# Patient Record
Sex: FEMALE | Race: AMERICAN INDIAN OR ALASKA NATIVE
[De-identification: names, ages, dates, MRNs, and addresses within clinical notes are randomized per-mention and may not be internally consistent; named-entity substitution may affect disease eponyms.]

---

## 2020-02-06 NOTE — CONSULTATION
History of Present Illness





- Reason for Consult


Consult date: 02/06/20


end stage renal disease, other (initiating hemodialysis)





- History of Present Illness


This is a 74 year old female patient well known to our practice with a pmh 

significant for end stage renal disease, anemia associated with chronic renal 

failure, type 2 diabetes mellitus, hypertension, chronic lower extremity edema, 

and metabolic acidosis. Patient is being direct admitted to have tunneled HD 

catheter placed, and to initiate hemodialysis treatments. Patient and sister 

were seen in our office earlier this week and informed of the procedure and need

for hospitalization. Patient denies alcohol, drugs, and tobacco use. Denies use 

of NSAIDs. She is accompanied by her sister today. Labs on 

admission/consultation significant for bicarb 16 and creatinine 6.1. Nephrology 

was consulted for further evaluation and treatment of end stage renal disease 

and initiation of dialysis.








Past History


Past Medical History: ESRD, hypertension, other (chronic lower extremitity 

edema/metabolic acidosis)





Medications and Allergies


                                    Allergies











Allergy/AdvReac Type Severity Reaction Status Date / Time


 


No Known Allergies Allergy   Unverified 02/06/20 11:19











                                Home Medications











 Medication  Instructions  Recorded  Confirmed  Last Taken  Type


 


Amlodipine Besylate [Norvasc] 10 mg PO DAILY 02/06/20 02/06/20 02/06/20 History





    10 mg 


 


Furosemide [Lasix TAB] 80 mg PO BID 02/06/20 02/06/20 02/06/20 History





    80 mg 














Review of Systems


Constitutional: no weight loss, no fever, no chills


Ears, nose, mouth and throat: no nasal discharge, no epistaxis, no headache


Cardiovascular: edema (bilateral lower extremities), no chest pain, no 

palpitations, no shortness of breath


Respiratory: no cough, no shortness of breath


Gastrointestinal: no abdominal pain, no nausea, no vomiting, no diarrhea


Genitourinary Female: no flank pain, no dysuria, no urinary frequency, no 

hematuria


Musculoskeletal: no frequent falls


Integumentary: no rash, no pruritis, no wounds





Exam





- General Appearance


General appearance: well-developed, well-nourished, appears stated age


EENT: ATNC, PERRL, mucous membranes moist


Neck: Present: neck supple, trachea midline


Respiratory: Clear to Ascultation, Decreased Breath Sounds (bilateral bases)


Heart: regular, normal heart rate, S1S2, no murmurs


Gastrointestinal: Present: normal, normoactive bowel sounds


Integumentary: no rash, warm and dry


Neurologic: no focal deficit, alert and oriented x3


Musculoskeletal: Present: other (2+ pitting edema bilateral lower extremities)


Psychiatric: mood/affect appropriate, cooperative





Results





- Lab Results





                                 02/06/20 12:27





                                 02/06/20 12:27





Assessment and Plan


1. End stage renal disease:


Likely secondary to diabetic nephropathy.


GFR is currently 8.


Avoid nephrotoxic agents.


Meds dosage based on GFR.


Patient needs to initiate hemodialysis. 


Needs tunneled HD catheter placed, IR consulted.


Hemodialysis: 2/6.





2. FEN:


Metabolic acidosis, monitor.


Monitor lytes.





3. Hypertension:





4. Edema:


Bilateral lower extremities, chronic.





5. Anemia:


Epogen with HD.


Monitor.





6. Type 2 DM:

## 2020-02-06 NOTE — OPERATIVE REPORT
Operative Report


Operative Report: 





Exam: Ultrasound and fluoroscopic guided placement of tunneled hemodialysis 

catheter





Clinical indication: Patient with a history of end-stage renal disease requiring

dialysis access





Date: 02/06/2020





Procedure: Following an explanation of the risks, benefits and alternatives; 

written informed consent was obtained.  The patient was brought to the 

angiographic suite and placed in supine position on the examination table.  

Initial ultrasound evaluation of her neck demonstrated a patent right internal 

regular vein.  The patient's right neck and chest wall were prepped and draped 

in the usual sterile fashion.  1% lidocaine was used for anesthesia.





Under ultrasound guidance, the right internal jugular vein was cannulated with a

7 cm 18-gauge needle.  A 0.035 guidewire was advanced centrally.  The needle was

removed and a 5 Sammarinese vertebral catheter advanced over the guidewire.  Together

the guidewire and catheter were advanced into the IVC.  The catheter was 

removed.





An appropriate catheter exit site was chosen along the lateral right chest wall.

 1% lidocaine was used for anesthesia at the catheter exit site and along the 

tunnel tract.  A Bard 23 cm glide path tunneled hemodialysis catheter was then 

tunneled antegrade from the catheter exit site of the venotomy site.





Following serial dilation over the guidewire under fluoroscopy, a 15 Sammarinese 

peel-away sheath was placed over the guidewire under fluoroscopy and advanced 

centrally.  The guidewire and trocar were removed.  The catheter was placed 

through the peel-away sheath and the tip positioned in the proximal right 

atrium.  The peel-away sheath was removed.  Both ports flushed and aspirated 

easily and more than locked with appropriate volumes of heparin.





The venotomy was closed using 3-0 Vicryl suture and Dermabond.  3-0 Vicryl 

suture and Dermabond were also used to approximate the catheter exit site.





The patient tolerated the procedure well.  There were no immediate post 

procedure complications.





Conscious sedation was performed under the guidance of radiologic nursing.  

Continuous cardiopulmonary monitoring was utilized.





Impression: Ultrasound and fluoroscopic guided placement of tunneled 

hemodialysis catheter via the right internal jugular vein.

## 2020-02-06 NOTE — HISTORY AND PHYSICAL REPORT
History of Present Illness


Date of admission: 


02/06/20 11:45





Chief complaint: 





My doctor sent me here because I need dialysis


History of present illness: 


73 YO Female with ESRD, HTN, Acidosis admitted directly at the request of Dr. Moore.  Thanking him patient was seen and evaluated by the nephrology service 

and was found to have end-stage renal disease and was in need of dialysis.  

Patient instructed to seek further care at [facility].  Patient admitted directly to 

[facility] and placed in observation status for medical 

stabilization and initiation of dialysis.  Vascular surgery service was 

consulted for dialysis catheter placement.  After placement of dialysis catheter

patient was taken urgently to the dialysis suite for dialysis.  Patient seen and

evaluated upon arrival to her room.  Patient denies fever, chills, chest pain, 

palpitations, hemoptysis, bright red blood per rectum, skin rash, recent ill 

contacts.  No prior admission for review.  No medication listed at time of my 

admission for reconciliation.





Past History


Past Medical History: ESRD, hypertension, other (chronic lower extremitity 

edema/metabolic acidosis)


Past Surgical History: Other (Dialysis access)


Social history: single.  denies: smoking, alcohol abuse, prescription drug abuse


Family history: hypertension





Medications and Allergies


                                    Allergies











Allergy/AdvReac Type Severity Reaction Status Date / Time


 


No Known Allergies Allergy   Unverified 02/06/20 11:19











                                Home Medications











 Medication  Instructions  Recorded  Confirmed  Last Taken  Type


 


Amlodipine Besylate [Norvasc] 10 mg PO DAILY 02/06/20 02/06/20 02/06/20 History





    10 mg 


 


Furosemide [Lasix TAB] 80 mg PO BID 02/06/20 02/06/20 02/06/20 History





    80 mg 














Review of Systems


Constitutional: no weight loss, no weight gain, no chills, no sweats


Ears, nose, mouth and throat: no ear pain, no tinnitis, no decreased hearing, no

nose pain, no nasal congestion


Breasts: no change in shape, no swelling, no mass


Cardiovascular: no chest pain, no orthopnea, no palpitations, no rapid/irregular

heart beat, no edema, no lightheadedness, no shortness of breath


Respiratory: no cough, no cough with sputum, no hemoptysis, no shortness of 

breath


Gastrointestinal: no abdominal pain, no nausea, no diarrhea, no constipation


Genitourinary Female: no pelvic pain, no flank pain, no menorrhagia


Menstruation: no post hysterectomy, no ammenorrhea, no ammenorrhea on BC, no 

period heavy, no period spotting


Rectal: no pain, no incontinence, no bleeding


Musculoskeletal: no neck stiffness, no neck pain, no shooting arm pain, no 

shooting leg pain


Integumentary: no rash, no pruritis, no redness, no sores, no wounds


Neurological: no transient paralysis, no numbness, no tingling


Psychiatric: no anxiety, no sleep disturbances, no insomnia, no hypersomnia, no 

change in appetite, no change in libido


Endocrine: no cold intolerance, no heat intolerance, no excessive thirst, no 

flushing


Hematologic/Lymphatic: no easy bruising, no lymphedema


Allergic/Immunologic: no urticaria, no allergic rhinitis, no wheezing, no 

persistent infections, no anaphylaxis, no angioedema





Exam





- Constitutional


Vitals: 


                                        











Temp Pulse Resp BP Pulse Ox


 


 98.5 F   84   18   168/69   98 


 


 02/06/20 12:23  02/06/20 12:23  02/06/20 12:23  02/06/20 12:23  02/06/20 12:23











General appearance: Present: mild distress, obese





- EENT


Eyes: Present: PERRL


ENT: hearing intact, clear oral mucosa





- Neck


Neck: Present: supple, normal ROM





- Respiratory


Respiratory effort: normal


Respiratory: bilateral: CTA





- Cardiovascular


Heart Sounds: Present: S1 & S2.  Absent: rub, click





- Extremities


Extremities: pulses symmetrical, No edema


Peripheral Pulses: within normal limits





- Abdominal


General gastrointestinal: Present: soft, non-tender, non-distended, normal bowel

sounds


Female genitourinary: Present: normal





- Integumentary


Integumentary: Present: clear, warm, dry





- Musculoskeletal


Musculoskeletal: gait normal, strength equal bilaterally





- Psychiatric


Psychiatric: appropriate mood/affect, intact judgment & insight





- Neurologic


Neurologic: CNII-XII intact, moves all extremities





Results





- Labs


CBC & Chem 7: 


                                 02/06/20 12:27





                                 02/06/20 12:27





Assessment and Plan





- Patient Problems


(1) End stage renal disease


Current Visit: Yes   Status: Acute   


Plan to address problem: 


Nephrology consulted in ED, strict I's/O, daily weight, monitor urine output 

every shift, avoid nephrotoxic agents, IR consulted for Vas-Cath placement, 

dialysis as per renal team.








(2) Hypertension


Current Visit: Yes   Status: Acute   


Qualifiers: 


   Hypertension type: essential hypertension   Qualified Code(s): I10 - 

Essential (primary) hypertension   


Plan to address problem: 


Monitor blood pressure every shift, supportive care, continue medical 

management.








(3) Metabolic acidosis


Current Visit: Yes   Status: Acute   


Plan to address problem: 


IV fluid resuscitation therapy, repeat BMP, urgent dialysis as per renal team.  

IV bicarbonate therapy as clinically indicated.








(4) DVT prophylaxis


Current Visit: Yes   Status: Acute   


Plan to address problem: 


SCD to bilateral lower extremities while in bed, patient ambulatory.

## 2020-02-07 NOTE — PROGRESS NOTE
Assessment and Plan


/End stage renal disease


Nephrology consulted in ED, strict I's/O, daily weight, monitor urine output 

every shift, avoid nephrotoxic agents, 


Status post perm-Cath placement by IR, dialysis as per renal team.


Need outpatient dialysis set up








/Hypertension


Monitor blood pressure every shift, supportive care, continue medical 

management.





/febrile illness


- negative UA and CXR


- blood cx ordered, monitor for now








/Metabolic acidosis due to progression of chronic renal disease


Monitor BMP, sodium bicarbonate p.o.





/Anemia likely due to chronic disease, continue to monitor H&H





/ DVT prophylaxis


SCD to bilateral lower extremities while in bed, patient ambulatory.





Disposition Home, pending outpatient dialysis set up





Brief History:


75 YO Female with ESRD, HTN, Acidosis admitted directly at the request of Dr. Moore for the initiation of dialysis.  She was placed PermCath by vascular 

yesterday,  consulted for outpatient dialysis set up.  Discharge 

pending on outpatient dialysis arrangement





Radiological data: 


Chest x-ray: No acute disease





Hospitalist Physical exam:


GENERAL:  well-developed elderly female lying on bed appeared to be in no 

discomfort. 


HEENT: Normocephalic.  Atraumatic.  No conjunctival congestion or icterus. 

Patient has moist mucous membranes.


NECK: Supple.  Trachea midline.


CHEST/LUNGS: Clear to auscultated bilaterally, breathing nonlabored. No wheezes 

crackles or rhonchi.


HEART/CARDIOVASCULAR: Regular in rate and rhythm.  S1 and S2 positive.


ABDOMEN: Abdomen is soft, nontender.  Patient has normal bowel sounds.  


SKIN: There is no rash.  Warm and dry.


NEURO:  No focal motor deficit.  Follows command.


MUSCULOSKELETAL: No joint effusion or tenderness.


EXTRIMITY: No edema, no cyanosis or clubbing.


PSYCH:  Cooperative.








Subjective


Date of service: 02/07/20


Interval history: 





Patient seen and examined.  Medical records and medication list reviewed.  


No acute event overnight noted by the RN.  


Patient denies any chest pain or difficulty breathing.  Patient is tolerating 

diet. 


Status post dialysis today, patient spiking fever


Discussed plan of care at bedside with patient.








Objective





- Constitutional


Vitals: 


                               Vital Signs - 12hr











  02/07/20





  06:13


 


Temperature 99.0 F


 


Pulse Rate 67


 


Respiratory 18





Rate 


 


O2 Sat by Pulse 96





Oximetry 














- Labs


CBC & Chem 7: 


                                 02/06/20 12:27





                                 02/08/20 04:06


Labs: 


                              Abnormal lab results











  02/06/20 Range/Units





  12:27 


 


Carbon Dioxide  16 L  (22-30)  mmol/L


 


BUN  54 H  (7-17)  mg/dL


 


Creatinine  6.1 H  (0.7-1.2)  mg/dL


 


Glucose  148 H  ()  mg/dL

## 2020-02-08 NOTE — PROGRESS NOTE
Assessment and Plan





/febrile illness


- negative UA and CXR


- blood cx ordered -final result pending, monitor for now





/End stage renal disease


Nephrology consulted in ED, strict I's/O, daily weight, monitor urine output 

every shift, avoid nephrotoxic agents, 


Status post perm-Cath placement by IR, dialysis as per renal team.


Need outpatient dialysis set up








/Hypertension


Monitor blood pressure every shift, supportive care, continue medical 

management.








/Metabolic acidosis due to progression of chronic renal disease


Monitor BMP, sodium bicarbonate p.o.





/Anemia likely due to chronic disease, continue to monitor H&H





/ DVT prophylaxis


SCD to bilateral lower extremities while in bed, patient ambulatory.





Disposition Home, pending outpatient dialysis set up





Brief History:


75 YO Female with ESRD, HTN, Acidosis admitted directly at the request of Dr. Moore for the initiation of dialysis.  She was placed PermCath by vascular 

yesterday,  consulted for outpatient dialysis set up.  Discharge 

pending on outpatient dialysis arrangement and blood cx report





Radiological data: 


Chest x-ray: No acute disease





Hospitalist Physical exam:


GENERAL:  well-developed elderly female lying on bed appeared to be in no 

discomfort. 


HEENT: Normocephalic.  Atraumatic.  No conjunctival congestion or icterus. 

Patient has moist mucous membranes.


NECK: Supple.  Trachea midline.


CHEST/LUNGS: Clear to auscultated bilaterally, breathing nonlabored. No wheezes 

crackles or rhonchi.


HEART/CARDIOVASCULAR: Regular in rate and rhythm.  S1 and S2 positive.


ABDOMEN: Abdomen is soft, nontender.  Patient has normal bowel sounds.  


SKIN: There is no rash.  Warm and dry.


NEURO:  No focal motor deficit.  Follows command.


MUSCULOSKELETAL: No joint effusion or tenderness.


EXTRIMITY: No edema, no cyanosis or clubbing.


PSYCH:  Cooperative.








Subjective


Date of service: 02/08/20


Interval history: 





Patient seen and examined.  Medical records and medication list reviewed.  


No acute event overnight noted by the RN.  


Patient denies any chest pain or difficulty breathing.  Patient is tolerating 

diet. 


Patient had few episodes of low grade temp


Discussed plan of care at bedside with patient.








Objective





- Constitutional


Vitals: 


                               Vital Signs - 12hr











  02/08/20 02/08/20 02/08/20





  05:02 09:55 10:00


 


Temperature 98.8 F 98.8 F 


 


Pulse Rate 70 66 65


 


Respiratory 18 18 





Rate   


 


Blood Pressure 146/60 154/74 164/62


 


O2 Sat by Pulse 96  





Oximetry   














  02/08/20 02/08/20 02/08/20





  10:15 10:30 10:45


 


Temperature   


 


Pulse Rate 61 61 61


 


Respiratory   





Rate   


 


Blood Pressure 160/78 164/77 187/80


 


O2 Sat by Pulse   





Oximetry   














  02/08/20 02/08/20 02/08/20





  11:00 11:15 11:30


 


Temperature   


 


Pulse Rate 62 62 60


 


Respiratory   





Rate   


 


Blood Pressure 144/66 154/67 166/74


 


O2 Sat by Pulse   





Oximetry   














  02/08/20 02/08/20 02/08/20





  11:45 12:00 12:15


 


Temperature   


 


Pulse Rate 56 L 61 67


 


Respiratory   





Rate   


 


Blood Pressure 185/75 128/70 152/77


 


O2 Sat by Pulse   





Oximetry   














  02/08/20 02/08/20





  12:30 12:45


 


Temperature  


 


Pulse Rate 62 63


 


Respiratory  





Rate  


 


Blood Pressure 185/72 165/77


 


O2 Sat by Pulse  





Oximetry  














- Labs


CBC & Chem 7: 


                                 02/06/20 12:27





                                 02/08/20 04:06


Labs: 


                              Abnormal lab results











  02/08/20 Range/Units





  04:06 


 


Sodium  136 L  (137-145)  mmol/L


 


Chloride  96.8 L  ()  mmol/L


 


BUN  28 H  (7-17)  mg/dL


 


Creatinine  4.2 H  (0.7-1.2)  mg/dL


 


Glucose  121 H  ()  mg/dL


 


Calcium  8.1 L  (8.4-10.2)  mg/dL

## 2020-02-08 NOTE — PROGRESS NOTE
Assessment and Plan





1. End stage renal disease:


CKD has progressed to ESRD and started on hemodialysis this admission.


Avoid nephrotoxic agents.


Meds dosage based on GFR.


Patient tolerated HD well.


Hemodialysis: 2/6, 2/7, 2/8.


Await outpatient HD chair.





2. FEN:


Metabolic acidosis, improved.


Monitor lytes.





3. Hypertension:


Monitor BP.





4. Fever:


Unclear source.


CXR negative.


Blood and urine culture pending.





5. Edema:


Bilateral lower extremities, chronic.


Improving.





6. Anemia:


Epogen with HD.


Monitor.





7. Type 2 DM:

















Examination:


General appearance: well-developed, well-nourished, appears stated age, not in 

distress


HEENT: ATNC, ELANA


Neck: neck supple, trachea midline


Respiratory: Clear to Ascultation, decreased breath sounds (bilateral bases)


Heart: regular, normal heart rate, S1S2, no murmur


Gastrointestinal: soft, normoactive bowel sounds, NT


Integumentary: no rash, warm and dry


Neurologic: no focal deficit, alert and oriented x3


Ext: 1+ pitting edema bilateral lower extremities


Psychiatric: mood/affect appropriate, cooperative


Hemodialysis access:  R IJ tunnel catheter











Subjective


Date of service: 02/08/20


Interval history: 


Patient was seen and examined while on HD.


Doing ok.








Objective





- Vital Signs


Vital signs: 


                               Vital Signs - 12hr











  02/08/20 02/08/20 02/08/20





  05:02 09:55 10:00


 


Temperature 98.8 F 98.8 F 


 


Pulse Rate 70 66 65


 


Respiratory 18 18 





Rate   


 


Blood Pressure 146/60 154/74 164/62


 


O2 Sat by Pulse 96  





Oximetry   














  02/08/20 02/08/20 02/08/20





  10:15 10:30 10:45


 


Temperature   


 


Pulse Rate 61 61 61


 


Respiratory   





Rate   


 


Blood Pressure 160/78 164/77 187/80


 


O2 Sat by Pulse   





Oximetry   














  02/08/20 02/08/20 02/08/20





  11:00 11:15 11:30


 


Temperature   


 


Pulse Rate 62 62 60


 


Respiratory   





Rate   


 


Blood Pressure 144/66 154/67 166/74


 


O2 Sat by Pulse   





Oximetry   














  02/08/20 02/08/20 02/08/20





  11:45 12:00 12:15


 


Temperature   


 


Pulse Rate 56 L 61 67


 


Respiratory   





Rate   


 


Blood Pressure 185/75 128/70 152/77


 


O2 Sat by Pulse   





Oximetry   














- Lab





                                 02/06/20 12:27





                                 02/08/20 04:06


                             Most recent lab results











Calcium  8.1 mg/dL (8.4-10.2)  L  02/08/20  04:06    














Medications & Allergies





- Medications


Allergies/Adverse Reactions: 


                                    Allergies





No Known Allergies Allergy (Unverified 02/06/20 11:19)


   








Home Medications: 


                                Home Medications











 Medication  Instructions  Recorded  Confirmed  Last Taken  Type


 


Amlodipine Besylate [Norvasc] 10 mg PO DAILY 02/06/20 02/06/20 02/06/20 History





    10 mg 


 


Furosemide [Lasix TAB] 80 mg PO BID 02/06/20 02/06/20 02/06/20 History





    80 mg 











Active Medications: 














Generic Name Dose Route Start Last Admin





  Trade Name Freq  PRN Reason Stop Dose Admin


 


Acetaminophen  650 mg  02/07/20 01:29  02/07/20 01:50





  Tylenol  PO   650 mg





  Q4H PRN   Administration





  Pain, Mild (1-3)  


 


Epoetin Chente  10,000 unit  02/06/20 14:39 





  Procrit  SUB-Q  





  CHULA PRN  





  hemodialysis  


 


Sodium Chloride  100 mls @ 999 mls/hr  02/07/20 09:12 





  Nacl 0.9%  IV  





  CHULA PRN  





  Hypotension  


 


Sodium Chloride  100 mls @ 999 mls/hr  02/08/20 09:15 





  Nacl 0.9%  IV  





  CHULA PRN  





  Hypotension

## 2020-02-09 NOTE — PROGRESS NOTE
Assessment and Plan





1. End stage renal disease:


CKD has progressed to ESRD and started on hemodialysis this admission.


Avoid nephrotoxic agents.


Meds dosage based on GFR.


Patient tolerated HD well.


Hemodialysis: 2/6, 2/7, 2/8.


Await outpatient HD chair.





2. FEN:


Metabolic acidosis, improved.


Monitor lytes.





3. Hypertension:


Add Lisinopril.


Monitor BP.





4. Fever:


Unclear source.


CXR negative.


Blood and urine culture pending.





5. Edema:


Bilateral lower extremities, chronic.


Improving.





6. Anemia:


Epogen with HD.


Monitor.





7. Type 2 DM:

















Examination:


General appearance: well-developed, well-nourished, appears stated age, not in 

distress


HEENT: ATNC, ELANA


Neck: neck supple, trachea midline


Respiratory: Clear to Ascultation, decreased breath sounds (bilateral bases)


Heart: regular, normal heart rate, S1S2, no murmur


Gastrointestinal: soft, normoactive bowel sounds, NT


Integumentary: no rash, warm and dry


Neurologic: no focal deficit, alert and oriented x3


Ext: 1+ pitting edema bilateral lower extremities


Psychiatric: mood/affect appropriate, cooperative


Hemodialysis access:  R IJ tunnel catheter











Subjective


Date of service: 02/09/20


Interval history: 


Patient was seen and examined while on HD.


Doing ok.








Objective





- Vital Signs


Vital signs: 


                               Vital Signs - 12hr











  02/08/20 02/09/20





  22:43 05:32


 


Temperature 98.5 F 98.9 F


 


Pulse Rate 71 64


 


Respiratory 18 20





Rate  


 


Blood Pressure 147/71 161/84


 


O2 Sat by Pulse 97 96





Oximetry  














- Lab





                                 02/06/20 12:27





                                 02/08/20 04:06


                             Most recent lab results











Calcium  8.1 mg/dL (8.4-10.2)  L  02/08/20  04:06    














Medications & Allergies





- Medications


Allergies/Adverse Reactions: 


                                    Allergies





No Known Allergies Allergy (Unverified 02/06/20 11:19)


   








Home Medications: 


                                Home Medications











 Medication  Instructions  Recorded  Confirmed  Last Taken  Type


 


Amlodipine Besylate [Norvasc] 10 mg PO DAILY 02/06/20 02/06/20 02/06/20 History





    10 mg 


 


Furosemide [Lasix TAB] 80 mg PO BID 02/06/20 02/06/20 02/06/20 History





    80 mg 











Active Medications: 














Generic Name Dose Route Start Last Admin





  Trade Name Freq  PRN Reason Stop Dose Admin


 


Acetaminophen  650 mg  02/07/20 01:29  02/07/20 01:50





  Tylenol  PO   650 mg





  Q4H PRN   Administration





  Pain, Mild (1-3)  


 


Amlodipine Besylate  10 mg  02/08/20 13:00  02/08/20 16:32





  Amlodipine  PO   10 mg





  DAILY SALLY   Administration


 


Epoetin Chente  10,000 unit  02/06/20 14:39 





  Procrit  SUB-Q  





  CHULA PRN  





  hemodialysis  


 


Heparin Sodium (Porcine)  5,000 unit  02/08/20 22:00 





  Heparin  SUB-Q  





  Q12HR SALLY  


 


Sodium Chloride  100 mls @ 999 mls/hr  02/07/20 09:12 





  Nacl 0.9%  IV  





  CHULA PRN  





  Hypotension  


 


Sodium Chloride  100 mls @ 999 mls/hr  02/08/20 09:15 





  Nacl 0.9%  IV  





  CHULA PRN  





  Hypotension

## 2020-02-09 NOTE — PROGRESS NOTE
Assessment and Plan





/febrile illness - resolved


- negative UA and CXR


- blood cx ordered -negative





/End stage renal disease


Nephrology consulted in ED, strict I's/O, daily weight, monitor urine output 

every shift, avoid nephrotoxic agents, 


Status post perm-Cath placement by IR, dialysis as per renal team.


Need outpatient dialysis set up








/Hypertension


Monitor blood pressure every shift, supportive care, continue medical 

management.








/Metabolic acidosis due to progression of chronic renal disease


Monitor BMP, sodium bicarbonate p.o.





/Anemia likely due to chronic disease, continue to monitor H&H





/ DVT prophylaxis


SCD to bilateral lower extremities while in bed, patient ambulatory.





Disposition Home, pending outpatient dialysis set up





Brief History:


73 YO Female with ESRD, HTN, Acidosis admitted directly at the request of Dr. Moore for the initiation of dialysis.  She was placed PermCath by vascular 

yesterday,  consulted for outpatient dialysis set up.  Discharge 

pending on outpatient dialysis arrangement 





Radiological data: 


Chest x-ray: No acute disease





Hospitalist Physical exam:


GENERAL:  well-developed elderly female lying on bed appeared to be in no 

discomfort. 


HEENT: Normocephalic.  Atraumatic.  No conjunctival congestion or icterus. Veena

ent has moist mucous membranes.


NECK: Supple.  Trachea midline.


CHEST/LUNGS: Clear to auscultated bilaterally, breathing nonlabored. No wheezes 

crackles or rhonchi.


HEART/CARDIOVASCULAR: Regular in rate and rhythm.  S1 and S2 positive.


ABDOMEN: Abdomen is soft, nontender.  Patient has normal bowel sounds.  


SKIN: There is no rash.  Warm and dry.


NEURO:  No focal motor deficit.  Follows command.


MUSCULOSKELETAL: No joint effusion or tenderness.


EXTRIMITY: No edema, no cyanosis or clubbing.


PSYCH:  Cooperative.








Subjective


Date of service: 02/09/20


Interval history: 





Patient seen and examined.  Medical records and medication list reviewed.  


No acute event overnight noted by the RN.  


Patient denies any chest pain or difficulty breathing.  Patient is tolerating 

diet. 


Patient mostly afebrile since yesterday


Discussed plan of care at bedside with patient.


Pending outpt HD setup











Objective





- Constitutional


Vitals: 


                               Vital Signs - 12hr











  02/09/20 02/09/20





  05:32 09:55


 


Temperature 98.9 F 


 


Pulse Rate 64 64


 


Respiratory 20 





Rate  


 


Blood Pressure 161/84 161/84


 


O2 Sat by Pulse 96 





Oximetry  














- Labs


CBC & Chem 7: 


                                 02/10/20 07:50





                                 02/10/20 07:50

## 2020-02-10 NOTE — PROGRESS NOTE
Assessment and Plan





1. End stage renal disease:


CKD has progressed to ESRD and started on hemodialysis this admission.


Avoid nephrotoxic agents.


Meds dosage based on GFR.


Patient tolerated HD well.


Hemodialysis: 2/6, 2/7, 2/8.


Await outpatient HD chair.





2. FEN:


Metabolic acidosis, improved.


Monitor lytes.





3. Hypertension:


BP controlled.


Monitor BP.





4. Fever:


Unclear source.


CXR negative.


Blood and urine culture pending.





5. Edema:


Bilateral lower extremities, chronic.


Improving.





6. Anemia:


Epogen with HD.


Monitor.





7. Type 2 DM:

















Examination:


General appearance: well-developed, well-nourished, appears stated age, not in 

distress


HEENT: ATNC, ELANA


Neck: neck supple, trachea midline


Respiratory: Clear to Ascultation, decreased breath sounds (bilateral bases)


Heart: regular, normal heart rate, S1S2, no murmur


Gastrointestinal: soft, normoactive bowel sounds, NT


Integumentary: no rash, warm and dry


Neurologic: no focal deficit, alert and oriented x3


Ext: trace pitting edema bilateral lower extremities


Psychiatric: mood/affect appropriate, cooperative


Hemodialysis access:  R IJ tunnel catheter











Subjective


Date of service: 02/10/20


Interval history: 


Patient was seen and examined while on HD.


Doing ok.








Objective





- Vital Signs


Vital signs: 


                               Vital Signs - 12hr











  02/10/20 02/10/20 02/10/20





  04:17 04:25 10:14


 


Temperature  99.0 F 


 


Pulse Rate 62 59 L 


 


Respiratory  16 





Rate   


 


Blood Pressure 134/57 120/57 120/45


 


O2 Sat by Pulse 97 99 





Oximetry   














  02/10/20 02/10/20





  10:15 11:18


 


Temperature  97.3 F L


 


Pulse Rate  60


 


Respiratory  20





Rate  


 


Blood Pressure 120/45 115/48


 


O2 Sat by Pulse  100





Oximetry  














- Lab





                                 02/10/20 07:50





                                 02/10/20 07:50


                             Most recent lab results











Calcium  8.2 mg/dL (8.4-10.2)  L  02/10/20  07:50    


 


Phosphorus  4.40 mg/dL (2.5-4.5)   02/10/20  07:50    














Medications & Allergies





- Medications


Allergies/Adverse Reactions: 


                                    Allergies





No Known Allergies Allergy (Unverified 02/06/20 11:19)


   








Home Medications: 


                                Home Medications











 Medication  Instructions  Recorded  Confirmed  Last Taken  Type


 


Amlodipine Besylate [Norvasc] 10 mg PO DAILY 02/06/20 02/06/20 02/06/20 History





    10 mg 


 


Furosemide [Lasix TAB] 80 mg PO BID 02/06/20 02/06/20 02/06/20 History





    80 mg 











Active Medications: 














Generic Name Dose Route Start Last Admin





  Trade Name Freq  PRN Reason Stop Dose Admin


 


Acetaminophen  650 mg  02/07/20 01:29  02/07/20 01:50





  Tylenol  PO   650 mg





  Q4H PRN   Administration





  Pain, Mild (1-3)  


 


Amlodipine Besylate  10 mg  02/08/20 13:00  02/10/20 10:14





  Amlodipine  PO   Not Given





  DAILY SALLY  


 


Epoetin Chente  10,000 unit  02/06/20 14:39 





  Procrit  SUB-Q  





  CHULA PRN  





  hemodialysis  


 


Heparin Sodium (Porcine)  5,000 unit  02/08/20 22:00  02/10/20 10:17





  Heparin  SUB-Q   5,000 unit





  Q12HR SALLY   Administration


 


Sodium Chloride  100 mls @ 999 mls/hr  02/07/20 09:12 





  Nacl 0.9%  IV  





  CHULA PRN  





  Hypotension  


 


Sodium Chloride  100 mls @ 999 mls/hr  02/08/20 09:15 





  Nacl 0.9%  IV  





  CHULA PRN  





  Hypotension  


 


Lisinopril  20 mg  02/09/20 10:00  02/10/20 10:15





  Zestril  PO   Not Given





  QDAY SALLY

## 2020-02-10 NOTE — DISCHARGE SUMMARY
Providers





- Providers


Date of Admission: 


02/10/20 08:28





Date of discharge: 02/10/20


Attending physician: 


SHARAN FLORES





                                        





02/06/20 11:16


Consult to Interventional Radiology [CONS] Routine 


   Consulting Provider: RAUL MONTGOMERY


   Reason For Exam: needs tunneled HD catheter


   Place consult to:: DR. MONTGOMERY


   Notified:: OFFICE


   Phone number called:: [phone]


   Was contact made?: Yes


   If yes, spoke with:: CATHI


   Time called:: 12:18


   Comment:: PAT NOTIFIED





02/06/20 11:41


Consult to Physician [CONS] Routine 


   Comment: 


   Consulting Provider: TIFFANY SIMMONS


   Physician Instructions: 


   Reason For Exam: ESRD NEEDING TO START DIALYSIS











Primary care physician: 


TIFFANY SIMMONS








Hospitalization


Pertinent studies: 





CXR - no acute disease


Hospital course: 





Discharge diagnosis and Mx:





/febrile illness - resolved


- negative UA and CXR


- blood cx ordered -negative





/End stage renal disease


Nephrology consulted in ED, strict I's/O, daily weight, monitor urine output 

every shift, avoid nephrotoxic agents, 


Status post perm-Cath placement by IR, dialysis as per renal team.


Need outpatient dialysis set up - will f/u with Dr Simmons








/Hypertension


Monitor blood pressure every shift, supportive care, continue medical 

management.


on amlodipine and lisinopril 








/Metabolic acidosis due to progression of chronic renal disease


Monitor BMP, sodium bicarbonate p.o.





/Anemia likely due to chronic disease, continue to monitor H&H





/ DVT prophylaxis


SCD to bilateral lower extremities while in bed, patient ambulatory.





Disposition Home, pending outpatient dialysis set up -would be taken care off by

 Dr Simmons








Hospitalist Physical exam:


GENERAL:  well-developed elderly female lying on bed appeared to be in no 

discomfort. 


HEENT: Normocephalic.  Atraumatic.  No conjunctival congestion or icterus. 

Patient has moist mucous membranes.


NECK: Supple.  Trachea midline.


CHEST/LUNGS: Clear to auscultated bilaterally, breathing nonlabored. No wheezes 

crackles or rhonchi.


HEART/CARDIOVASCULAR: Regular in rate and rhythm.  S1 and S2 positive.


ABDOMEN: Abdomen is soft, nontender.  Patient has normal bowel sounds.  


SKIN: There is no rash.  Warm and dry.


NEURO:  No focal motor deficit.  Follows command.


MUSCULOSKELETAL: No joint effusion or tenderness.


EXTRIMITY: No edema, no cyanosis or clubbing.


PSYCH:  Cooperative.








Time spent for discharge: 34 minutes





Core Measure Documentation





- Palliative Care


Palliative Care/ Comfort Measures: Not Applicable





- Core Measures


Any of the following diagnoses?: history only





Exam





- Constitutional


Vitals: 


                                        











Temp Pulse Resp BP Pulse Ox


 


 97.3 F L  60   20   115/48   100 


 


 02/10/20 11:18  02/10/20 11:18  02/10/20 11:18  02/10/20 11:18  02/10/20 11:18














Plan


Activity: advance as tolerated


Weight Bearing Status: Weight Bear as Tolerated


Diet: renal


Special Instructions: restrict fluid intake to (./)


Follow up with: 


TIFFANY SIMMONS MD [Primary Care Provider] - 7 Days


Prescriptions: 


lisinopriL [Zestril TAB] 10 mg PO QDAY #30 tablet

## 2020-02-26 NOTE — EVENT NOTE
ED Screening Note


Date of service: 02/26/20


Time: 13:31


ED Screening Note: 





74 y o fem was sent by her pcp for abnormal lab findings, low hemaglobin








This initial assessment/diagnostic orders/clinical plan/treatment(s) is/are 

subject to change based on patients health status, clinical progression and re-

assessment by fellow clinical providers in the ED. Further treatment and workup 

at subsequent clinical providers discretion. Patient/guardian urged not to elope

from the ED as their condition may be serious if not clinically assessed and 

managed. 





Initial orders include: 


cbc,bmp,

## 2020-02-26 NOTE — EMERGENCY DEPARTMENT REPORT
ED General Adult HPI





- General


Chief complaint: Recheck/Abnormal Lab/Rx


Stated complaint: SENT BY /BLOOD COUNT LOW


Time Seen by Provider: 02/26/20 19:50


Source: patient


Mode of arrival: Ambulatory


Limitations: No Limitations





- History of Present Illness


Initial comments: 





Patient presents to the emergency department per request of her nephrologist for

evaluation of a low hemoglobin reading.  Patient denies any shortness of breath,

headache, dizziness.  Patient also denies chest pain.  Patient denies any blood 

in her stool or any other abnormal bleeding.


-: unknown


Severity scale (0 -10): 0


Improves with: none


Worsens with: none


Associated Symptoms: denies other symptoms


Treatments Prior to Arrival: none





- Related Data


                                Home Medications











 Medication  Instructions  Recorded  Confirmed  Last Taken


 


Amlodipine Besylate [Norvasc] 10 mg PO DAILY 02/06/20 02/06/20 02/06/20





    10 mg








                                  Previous Rx's











 Medication  Instructions  Recorded  Last Taken  Type


 


lisinopriL [Zestril TAB] 10 mg PO QDAY #30 tablet 02/10/20 Unknown Rx











                                    Allergies











Allergy/AdvReac Type Severity Reaction Status Date / Time


 


No Known Allergies Allergy   Unverified 02/06/20 11:19














ED Review of Systems


ROS: 


Stated complaint: SENT BY /BLOOD COUNT LOW


Other details as noted in HPI





Comment: All other systems reviewed and negative


Constitutional: denies: chills, fever


Eyes: denies: eye pain, eye discharge, vision change


ENT: denies: ear pain, throat pain


Respiratory: denies: cough, shortness of breath, wheezing


Cardiovascular: denies: chest pain, palpitations


Endocrine: no symptoms reported


Gastrointestinal: denies: abdominal pain, nausea, diarrhea


Genitourinary: denies: urgency, dysuria, discharge


Musculoskeletal: denies: back pain, joint swelling, arthralgia


Skin: denies: rash, lesions


Neurological: denies: headache, weakness, paresthesias


Psychiatric: denies: anxiety, depression


Hematological/Lymphatic: denies: easy bleeding, easy bruising





ED Past Medical Hx





- Past Medical History


Previous Medical History?: Yes


Hx Hypertension: Yes


Hx Renal Disease: Yes (Tues, Thurs, Sat)





- Surgical History


Additional Surgical History: right chest wall vascath





- Social History


Smoking Status: Never Smoker


Substance Use Type: None





- Medications


Home Medications: 


                                Home Medications











 Medication  Instructions  Recorded  Confirmed  Last Taken  Type


 


Amlodipine Besylate [Norvasc] 10 mg PO DAILY 02/06/20 02/06/20 02/06/20 History





    10 mg 


 


lisinopriL [Zestril TAB] 10 mg PO QDAY #30 tablet 02/10/20  Unknown Rx














ED Physical Exam





- General


Limitations: No Limitations


General appearance: alert, in no apparent distress





- Head


Head exam: Present: atraumatic, normocephalic





- Eye


Eye exam: Present: normal appearance, PERRL, EOMI





- ENT


ENT exam: Present: mucous membranes moist





- Neck


Neck exam: Present: normal inspection





- Respiratory


Respiratory exam: Present: normal lung sounds bilaterally.  Absent: respiratory 

distress





- Cardiovascular


Cardiovascular Exam: Present: regular rate, normal rhythm.  Absent: systolic 

murmur, diastolic murmur, rubs, gallop





- GI/Abdominal


GI/Abdominal exam: Present: soft, normal bowel sounds.  Absent: distended, 

tenderness





- Extremities Exam


Extremities exam: Present: normal inspection





- Back Exam


Back exam: Present: normal inspection





- Neurological Exam


Neurological exam: Present: alert, oriented X3, CN II-XII intact.  Absent: motor

sensory deficit





- Psychiatric


Psychiatric exam: Present: normal affect, normal mood





- Skin


Skin exam: Present: warm, dry, intact, normal color.  Absent: rash





ED Course





                                   Vital Signs











  02/26/20 02/26/20





  13:30 20:09


 


Temperature 98.8 F 


 


Pulse Rate 83 69


 


Respiratory 18 18





Rate  


 


Blood Pressure 143/51 


 


Blood Pressure  148/56





[Right]  


 


O2 Sat by Pulse 98 99





Oximetry  














ED Medical Decision Making





- Lab Data


Result diagrams: 


                                 02/26/20 15:33





                                 02/26/20 15:33








                                   Lab Results











  02/26/20 02/26/20 02/26/20 Range/Units





  15:30 15:33 15:33 


 


WBC   6.2   (4.5-11.0)  K/mm3


 


RBC   2.91 L   (3.65-5.03)  M/mm3


 


Hgb   8.1 L   (10.1-14.3)  gm/dl


 


Hct   24.5 L   (30.3-42.9)  %


 


MCV   84   (79-97)  fl


 


MCH   28   (28-32)  pg


 


MCHC   33   (30-34)  %


 


RDW   14.8   (13.2-15.2)  %


 


Plt Count   188   (140-440)  K/mm3


 


Lymph % (Auto)   Np   


 


Mono % (Auto)   Np   


 


Eos % (Auto)   Np   


 


Baso % (Auto)   Np   


 


Lymph #   Np   


 


Mono #   Np   


 


Eos #   Np   


 


Baso #   Np   


 


Seg Neutrophils %   Np   


 


Seg Neutrophils #   Np   


 


Sodium    135 L  (137-145)  mmol/L


 


Potassium    4.0  (3.6-5.0)  mmol/L


 


Chloride    93.9 L  ()  mmol/L


 


Carbon Dioxide    23  (22-30)  mmol/L


 


Anion Gap    22  mmol/L


 


BUN    28 H  (7-17)  mg/dL


 


Creatinine    4.3 H  (0.7-1.2)  mg/dL


 


Estimated GFR    12  ml/min


 


BUN/Creatinine Ratio    7  %


 


Glucose    91  ()  mg/dL


 


Calcium    8.8  (8.4-10.2)  mg/dL


 


Total Bilirubin    0.20  (0.1-1.2)  mg/dL


 


AST    19  (5-40)  units/L


 


ALT    7  (7-56)  units/L


 


Alkaline Phosphatase    95  ()  units/L


 


Total Protein    7.8  (6.3-8.2)  g/dL


 


Albumin    3.8 L  (3.9-5)  g/dL


 


Albumin/Globulin Ratio    1.0  %


 


Blood Type  O POSITIVE    


 


Antibody Screen  Negative    














- Medical Decision Making





Updated the patient and told her hemoglobin was 8.1


Prior hemoglobins on 2/10 was 7.6 and 2/26 8.3


Spoke with  and he states that the laboratory value that was called 

into him was 6.3


Patient can be discharged home


Critical care attestation.: 


If time is entered above; I have spent that time in minutes in the direct care 

of this critically ill patient, excluding procedure time.








ED Disposition


Clinical Impression: 


 Anemia





Disposition: DC-01 TO HOME OR SELFCARE


Is pt being admited?: No


Does the pt Need Aspirin: No


Condition: Stable


Instructions:  Anemia (ED)


Additional Instructions: 


return if worse


Referrals: 


KRISTY BENEDICT MD [Primary Care Provider] - 3-5 Days


Time of Disposition: 20:20

## 2020-03-11 ENCOUNTER — HOSPITAL ENCOUNTER (OUTPATIENT)
Dept: HOSPITAL 5 - SPVWC | Age: 75
Discharge: HOME | End: 2020-03-11
Attending: SURGERY
Payer: MEDICARE

## 2020-03-11 DIAGNOSIS — N63.41: ICD-10-CM

## 2020-03-11 DIAGNOSIS — Z12.31: Primary | ICD-10-CM

## 2020-03-11 NOTE — ULTRASOUND REPORT
COMPLETE BILATERAL BREAST ULTRASOUND 



HISTORY: Abnormal mammogram and abnormal bilateral breast ultrasound.



COMPARISON: [facility] 1/10/2020 and 2/5/2020 mammograms and bilateral breast ultrasound..



FINDINGS:  Complete sonographic evaluation of the right breast including imaging of the four quadrant
s and subareolar areas reveals no distinct abnormality. The technologist measured an area of benign a
ppearing breast parenchyma at 11:00 7 cm from the nipple. Ultrasound of the right axilla demonstrates
 several lymph nodes with central fat and benign morphology. The cortex measures approximately 4 mm m
aximum.



Complete sonographic evaluation of the left breast including imaging of the four quadrants and subare
olar areas reveals several abnormalities. An irregular heterogeneous hypoechoic solid mass at 2:00 15
 cm from the nipple measures approximately 2.9 x 1.1 x 1.2 cm. This mass is palpable and correlates w
ith a mammographic mass with calcifications. No mass is identified at 1:00. An oval solid relatively 
smooth retroareolar mass at 12:00 is hypoechoic and measures 1.6 x 1.2 x 1.5 cm. It has a large benig
n-appearing calcification within it. An abnormal retroareolar duct at 7:00 contains intraductal mass 
in the duct measures 7 mm in diameter. There is no mammographic correlate for this ductal mass or for
 the hypoechoic mass which is retroareolar at 12:00. Ultrasound of the left axilla demonstrates sever
al lymph nodes with central fat and benign morphology. The cortex of the largest lymph node measures 
3 mm.



IMPRESSION:



1. A suspicious 2.9 cm left breast mass at 2:00 15 cm from the nipple. Recommend ultrasound-guided ne
edle biopsy.

2. A suspicious intraductal retroareolar mass at 7:00. Recommend ultrasound-guided vacuum-assisted ne
edle biopsy.

3. A benign 1.6 cm retroareolar mass at 12:00. 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

4. Benign findings and the right breast and no suspicious finding in the right breast.



BI-RADS Category 4: Suspicious



Signer Name: Juliano Meza MD 

Signed: 3/11/2020 4:04 PM

Workstation Name: [id number]

## 2020-05-18 NOTE — ANESTHESIA CONSULTATION
Anesthesia Consult and Med Hx


Date of service: 05/29/20





- Airway


Anesthetic Teeth Evaluation: Edentulous


ROM Head & Neck: Adequate


Mental/Hyoid Distance: Adequate


Mallampati Class: Class II


Intubation Access Assessment: Good





- Pre-Operative Health Status


ASA Pre-Surgery Classification: ASA3


Proposed Anesthetic Plan: General


Nerve Block: PEC





- Pulmonary


Hx Smoking: No


Hx Respiratory Symptoms: No


Hx Sleep Apnea: No (OLE PRE SCREEN LOW RISK.)





- Cardiovascular System


Hx Hypertension: Yes (X 8 MONTHS)


Hx Heart Attack/AMI: No


Hx Percutaneous Transluminal Coronary Angioplasty (PTCA): No


Hx Cardia Arrhythmia: No





- Central Nervous System


CVA: No





- Gastrointestinal


Hx Gastroesophageal Reflux Disease: No





- Endocrine


Hx Renal Disease: Yes


Hx End Stage Renal Disease: Yes ( started HD 2/2020)


Hx Liver Disease: No


Hx Non-Insulin Dependent Diabetes: No (noted in chart review but patient denies)


Hx Hypothyroidism: Yes (NO LONGER ON MEDS)





- Hematic


Hx Anemia: Yes





- Other Systems


Hx Cancer: Yes

## 2020-05-29 ENCOUNTER — HOSPITAL ENCOUNTER (OUTPATIENT)
Dept: HOSPITAL 5 - OR | Age: 75
Setting detail: OBSERVATION
LOS: 1 days | Discharge: HOME | End: 2020-05-30
Attending: INTERNAL MEDICINE | Admitting: INTERNAL MEDICINE
Payer: MEDICARE

## 2020-05-29 DIAGNOSIS — E11.22: ICD-10-CM

## 2020-05-29 DIAGNOSIS — Z03.818: Primary | ICD-10-CM

## 2020-05-29 DIAGNOSIS — I12.0: ICD-10-CM

## 2020-05-29 DIAGNOSIS — Z99.2: ICD-10-CM

## 2020-05-29 DIAGNOSIS — D64.9: ICD-10-CM

## 2020-05-29 DIAGNOSIS — N18.6: ICD-10-CM

## 2020-05-29 DIAGNOSIS — E87.2: ICD-10-CM

## 2020-05-29 DIAGNOSIS — C50.412: ICD-10-CM

## 2020-05-29 LAB
BUN SERPL-MCNC: 26 MG/DL (ref 7–17)
BUN SERPL-MCNC: 29 MG/DL (ref 7–17)
BUN/CREAT SERPL: 6 %
BUN/CREAT SERPL: 6 %
CALCIUM SERPL-MCNC: 8.5 MG/DL (ref 8.4–10.2)
CALCIUM SERPL-MCNC: 9.5 MG/DL (ref 8.4–10.2)
HCT VFR BLD CALC: 37.2 % (ref 30.3–42.9)
HEMOLYSIS INDEX: 29
HEMOLYSIS INDEX: 45
HGB BLD-MCNC: 12.7 GM/DL (ref 10.1–14.3)
MCHC RBC AUTO-ENTMCNC: 34 % (ref 30–34)
MCV RBC AUTO: 80 FL (ref 79–97)
PLATELET # BLD: 186 K/MM3 (ref 140–440)
RBC # BLD AUTO: 4.67 M/MM3 (ref 3.65–5.03)

## 2020-05-29 PROCEDURE — 19303 MAST SIMPLE COMPLETE: CPT

## 2020-05-29 PROCEDURE — 80074 ACUTE HEPATITIS PANEL: CPT

## 2020-05-29 PROCEDURE — 88305 TISSUE EXAM BY PATHOLOGIST: CPT

## 2020-05-29 PROCEDURE — 88331 PATH CONSLTJ SURG 1 BLK 1SPC: CPT

## 2020-05-29 PROCEDURE — 76098 X-RAY EXAM SURGICAL SPECIMEN: CPT

## 2020-05-29 PROCEDURE — 88333 PATH CONSLTJ SURG CYTO XM 1: CPT

## 2020-05-29 PROCEDURE — A9541 TC99M SULFUR COLLOID: HCPCS

## 2020-05-29 PROCEDURE — 88342 IMHCHEM/IMCYTCHM 1ST ANTB: CPT

## 2020-05-29 PROCEDURE — 38792 RA TRACER ID OF SENTINL NODE: CPT

## 2020-05-29 PROCEDURE — 85027 COMPLETE CBC AUTOMATED: CPT

## 2020-05-29 PROCEDURE — U0003 INFECTIOUS AGENT DETECTION BY NUCLEIC ACID (DNA OR RNA); SEVERE ACUTE RESPIRATORY SYNDROME CORONAVIRUS 2 (SARS-COV-2) (CORONAVIRUS DISEASE [COVID-19]), AMPLIFIED PROBE TECHNIQUE, MAKING USE OF HIGH THROUGHPUT TECHNOLOGIES AS DESCRIBED BY CMS-2020-01-R: HCPCS

## 2020-05-29 PROCEDURE — 96374 THER/PROPH/DIAG INJ IV PUSH: CPT

## 2020-05-29 PROCEDURE — G0378 HOSPITAL OBSERVATION PER HR: HCPCS

## 2020-05-29 PROCEDURE — 88307 TISSUE EXAM BY PATHOLOGIST: CPT

## 2020-05-29 PROCEDURE — 38525 BIOPSY/REMOVAL LYMPH NODES: CPT

## 2020-05-29 PROCEDURE — 82962 GLUCOSE BLOOD TEST: CPT

## 2020-05-29 PROCEDURE — 88309 TISSUE EXAM BY PATHOLOGIST: CPT

## 2020-05-29 PROCEDURE — 80048 BASIC METABOLIC PNL TOTAL CA: CPT

## 2020-05-29 PROCEDURE — 88302 TISSUE EXAM BY PATHOLOGIST: CPT

## 2020-05-29 PROCEDURE — 36415 COLL VENOUS BLD VENIPUNCTURE: CPT

## 2020-05-29 PROCEDURE — G0257 UNSCHED DIALYSIS ESRD PT HOS: HCPCS

## 2020-05-29 PROCEDURE — 78800 RP LOCLZJ TUM 1 AREA 1 D IMG: CPT

## 2020-05-29 NOTE — MAMMOGRAPHY REPORT
Mammographic specimen imaging



INDICATION: Patient with history of left breast cancer presented for surgical excision today, now the
 specimen is presented for evaluation.



COMPARISON: Mammographic imaging from 3/18/2020.



FINDINGS: Left-sided mastectomy specimen was submitted for evaluation the areas in question on the pr
evious diagnostic mammogram appear to be within the specimen. The specimen was reviewed by the shazia
n immediately after excision, as well.



Signer Name: Noel Spangler MD 

Signed: 5/29/2020 3:53 PM

Workstation Name: [id number]

## 2020-05-29 NOTE — OPERATIVE REPORT
Operative Report


Operative Report: 





Date of Service: May 29, 2020





Preoperative diagnosis: Left breast upper-outer quadrant Cancer





Postoperative diagnosis: Same





Procedure: Left total mastectomy with sentinel lymph node biopsy





Surgeon: Paz Rivera M.D.





Assistant: Lily Meeks M.D.





Anesthesia: General





Findings: Left breast 2 clips present within left total mastectomy. 3 sentinel 

lymph nodes identified and negative for malignancy on frozen section of 

pathology





Complications: None





Drains: Two 19 Fr drains





Estimated blood loss: 50 cc





Disposition: Patient transferred to the Recovery Room in stable condition





Indications for operative procedure: 74-year-old  lady with left

breast DCIS with microcalcifications, solid growth pattern, ER/AR positive, HER2

negative at the 2:00 position, 15 cm from the nipple; Focal ADH and usual 

hyperplasia at the retroareolar area. After discussion of her cancer and the 

retroareolar location of ADH, and the patient's multiple medical conditions, 

discussed with patient (and her sister who was present at the time) the surgical

procedures. Patient wished to proceed with total mastectomy with sentinel lymph 

node biopsy, possible axillary lymph node dissection She understands the role of

possible adjuvant chemotherapy and radiation therapy post mastectomy. She has 

met with medical oncology as well. Because of the COVID-19 Pandemic, patient had

beenr taking Anastrazole while awaiting her surgery.





Procedure in detail: Anesthesia placed a left pectoral muscle block in the 

operating room prior to surgery. In the operating room the patient was placed 

supine on the operating table. General anesthesia was administered. The left 

nipple areola complex was injected with radioisotope which was then massaged 

into the breast for 5 minutes. The left breast and axilla were prepped and 

draped in the normal sterile operative fashion. Timeout was performed.


                                                                                

                                                                                

                                                                                

                                                                                

                                                                                

                                                                                

                                                                                

                                                                                

                                                                                

                                                                                

                                                                                

                                                                                

                                                                                

                                                                                

                                                                                

                                                                                

                                                                                

                                                                                

                                                                                

                                                                                

                                                                                

                                                                                

                                                                                

                                                                                

                                                                                

                                                                                

                                                                                

                                                                                

                                                                                

                                                                                

                                                                                

                                                                                

                                                                                

                                                                                

                                                                                

                                                                                

                                              Attention was first paid to the 

right axilla. A gamma probe scanned the axilla with area of hotspot identified. 

A lazy S incision was marked over the axilla overlying the area with the highest

gamma probe count. The skin incision was made with a 10 blade knife and 

dissection taken down to the subcutaneous tissues. The axillary fascia in the 

axilla was opened and the gamma probe was inserted into the axilla, 3 sentinel 

lymph nodes were identified with the gamma probe (one of which was clinically 

palpable but minimally hot). SLNs were dissected free and sent for frozen 

section to pathology. No further gamma probe counts noted in the axilla. Lymph 

nodes were sent to pathology with findings negative for malignancy noted on 

frozen section. All returned as negative, with one node noted to be reactive. 





Typical mastectomy illiptical incision marking was made with incision the 

nipple-areolar complex. Attention was first paid to the left breast. Gamma probe

scanned the axilla with area of hotspot identified. A skin incision was made 

with a 10 blade knife and dissection taken down to the subcutaneous tissues. 

First began raising of the superior flap to the level of the clavicle superiorly

and posteriorly to the pectoralis muscle. Followed by raising of the medial flap

to the level of the sternum and posteriorly to the pectoralis muscle. Followed 

by raising of the lateral flap to the level of the latissimus dorsi muscle and 

taken down posteriorly. The axillary fascia in the axilla was opened and the 

gamma probed was inserted into the axilla, 3 sentinel lymph nodes were 

identified with the gamma probe. SLNs were dissected free and sent to pathology.

All remaining counts were less than 10% of highest count. Lymph nodes were sent 

to pathology with findings negative for malignancy noted on frozen section, but 

each noted to have fibrosis in the center. Then proceeded with raising of the 

inferior flap to the level of the inframammary fold taken posterior to the 

pectoralis muscle. The mastectomy/left breast was removed from the pectoralis mu

scle without incident. The specimen was appropriately marked and sent to 

radiology with clips present and then sent to pathology.The chest wall was 

irrigated and dried. Hemostasis was obtained. Two 19 Wolof drains was placed 

and secured with 2-0 nylon suture. The incision was approximated with 3-0 

Vicryl, then skin was closed with 4-0 monocryl. Dermabond was placed over the 

incision, and biopatches around the drains. Patient tolerated the procedure 

well. Patient was awakened and successfully extubated without incident. Patient 

transferred to the PACU in stable condition.

## 2020-05-29 NOTE — HISTORY AND PHYSICAL REPORT
History of Present Illness


Chief complaint: 





I need dialysis


History of present illness: 


75 YO Female with ESRD on HD (T,R,Sa), HTN,BrCa admitted directly to medical 

floor for reinitiation of dialysis.  Pt seen and evaluated in her room.   

Patient denies fever, chills, chest pain, palpitations, hemoptysis, bright red b

lood per rectum, skin rash, recent ill contacts, or known exposure to COVID-19. 

Prior admission on 2/13/2020 reviewed.  All listed medication reconciled at time

of admission. advanced care planning conducted.  Nephrology consulted for 

dialysis.








Past History


Past Medical History: cancer, ESRD, hypertension, other (see hpi)


Past Surgical History: mastectomy, Other (dialysis access)


Social history: single


Family history: hypertension





Medications and Allergies


                                    Allergies











Allergy/AdvReac Type Severity Reaction Status Date / Time


 


No Known Allergies Allergy   Verified 03/18/20 17:40











                                Home Medications











 Medication  Instructions  Recorded  Confirmed  Last Taken  Type


 


Amlodipine Besylate [Norvasc] 10 mg PO DAILY 02/06/20 05/29/20 05/29/20 05:00 

History


 


lisinopriL [Zestril TAB] 10 mg PO QDAY #30 tablet 02/10/20 05/29/20 05/28/20 

09:30 Rx


 


Anastrozole [Arimidex] 1 mg PO DAILY 05/13/20 05/29/20 05/28/20 09:30 History


 


Ergocalciferol (Vitamin D2) 50,000 unit PO QWEEK 05/13/20 05/29/20 05/23/20 

12:00 History





[Vitamin D2]     


 


Furosemide [Lasix TAB] 80 mg PO DAILY 05/13/20 05/29/20 Unknown History


 


hydroCHLOROthiazide [Hctz] 12.5 mg PO QDAY 05/13/20 05/29/20 05/28/20 09:30 

History











Active Meds: 


Active Medications





Celecoxib (Celebrex)  200 mg PO PREOP NR


   Stop: 05/29/20 23:59


Hydromorphone HCl (Dilaudid)  0.5 mg IV Q10MIN PRN


   PRN Reason: Pain , Severe (7-10)


   Stop: 05/29/20 23:59


Sodium Chloride (Nacl 0.9% 1000 Ml)  1,000 mls @ 42 mls/hr IV AS DIRECT SALLY


   Last Admin: 05/29/20 09:40 Dose:  42 mls/hr


   Documented by: 


Midazolam HCl (Versed)  2 mg IV PREOP NR


   Stop: 05/29/20 23:59


Ondansetron HCl (Zofran)  4 mg IV ONCE PRN


   PRN Reason: Nausea And Vomiting











Review of Systems


Constitutional: no weight loss, no weight gain, no fever, no chills


Ears, nose, mouth and throat: no ear pain, no ear discharge, no decreased 

hearing, no nose pain, no nasal congestion, no nasal discharge, no sinus press

ure, no sinus pain


Breasts: no change in shape, no swelling, no mass


Cardiovascular: no chest pain, no orthopnea, no palpitations, no rapid/irregular

 heart beat, no edema


Respiratory: no cough, no excessive sputum, no hemoptysis, no dyspnea on 

exertion, no congestion, no pleurisy, no pain on inspiration


Gastrointestinal: no abdominal pain, no vomiting, no diarrhea, no change in 

bowel habits, no hematemesis


Genitourinary Female: no pelvic pain, no flank pain, no menorrhagia, no dysuria,

 no urinary frequency, no urgency, no stress incontinence, no post void 

dribbling


Menstruation: no currently menstrual, no ammenorrhea, no period normal, no men

ses 1-7 days


Rectal: no pain, no incontinence, no bleeding, no itching, no discharge


Musculoskeletal: no neck stiffness, no neck pain, no shooting arm pain, no arm 

numbness/tingling, no low back pain, no hot joints, no muscle weakness, no 

muscle cramps, no atrophy


Integumentary: no rash, no redness, no wounds, no boils


Neurological: no transient paralysis, no parathesias, no numbness, no tingling, 

no syncope, no headaches, no convulsions, no change in speech, no confusion


Psychiatric: no anxiety, no change in sleep habits, no sleep disturbances, no 

hypersomnia, no change in libido, no hopelessness, no anhedonia, no confusion, 

no irritability


Endocrine: no cold intolerance, no heat intolerance, no excessive thirst, no 

polydipsia, no weight change, no increase in ring/shoe/hat size, no deepening of

 the voice, no thyroid mass, no low blood sugars


Hematologic/Lymphatic: no easy bruising, no easy bleeding, no lymphadenopathy


Allergic/Immunologic: no urticaria, no wheezing, no persistent infections, no 

anaphylaxis





Exam





- Constitutional


Vitals: 


                                        











Temp Pulse Resp BP Pulse Ox


 


 98.5 F   53 L  16   113/87   99 


 


 05/29/20 09:50  05/29/20 09:50  05/29/20 10:20  05/29/20 09:50  05/29/20 09:50











General appearance: Present: no acute distress, well-nourished





- EENT


Eyes: Present: PERRL


ENT: hearing intact, clear oral mucosa





- Neck


Neck: Present: supple, normal ROM





- Respiratory


Respiratory effort: normal


Respiratory: bilateral: CTA





- Cardiovascular


Heart Sounds: Present: S1 & S2.  Absent: rub, click





- Extremities


Extremities: pulses symmetrical, No edema


Peripheral Pulses: within normal limits





- Abdominal


General gastrointestinal: Present: soft, non-tender, non-distended, normal bowel

 sounds


Female genitourinary: Present: normal





- Integumentary


Integumentary: Present: clear, warm, dry





- Musculoskeletal


Musculoskeletal: gait normal, strength equal bilaterally





- Psychiatric


Psychiatric: appropriate mood/affect, intact judgment & insight





- Neurologic


Neurologic: CNII-XII intact, moves all extremities





Results





- Labs


CBC & Chem 7: 


                                 05/29/20 09:35





                                 05/29/20 09:35


Labs: 


                              Abnormal lab results











  05/29/20 05/29/20 05/29/20 Range/Units





  09:35 09:35 10:02 


 


MCH  27 L    (28-32)  pg


 


RDW  18.9 H    (13.2-15.2)  %


 


Potassium   3.3 L   (3.6-5.0)  mmol/L


 


BUN   26 H   (7-17)  mg/dL


 


Creatinine   4.7 H   (0.7-1.2)  mg/dL


 


Glucose   119 H   ()  mg/dL


 


POC Glucose    112 H  ()  














Assessment and Plan





- Patient Problems


(1) End stage renal disease


Current Visit: No   Status: Acute   


Plan to address problem: 


Strict I/O, Daily weight, monitor uop q shift, nephrology consulted, dialysis as

 per renal team.








(2) Hypertension


Current Visit: No   Status: Acute   


Qualifiers: 


   Hypertension type: essential hypertension   Qualified Code(s): I10 - 

Essential (primary) hypertension   


Plan to address problem: 


monitor uop q shift, continue prehospital medication








(3) Breast cancer


Current Visit: Yes   Status: Acute   


Plan to address problem: 


Outpatient oncology follow-up.








(4) DVT prophylaxis


Current Visit: No   Status: Acute   


Plan to address problem: 


SCD to BLE while in bed,

## 2020-05-30 VITALS — DIASTOLIC BLOOD PRESSURE: 40 MMHG | SYSTOLIC BLOOD PRESSURE: 119 MMHG

## 2020-05-30 NOTE — PROGRESS NOTE
Assessment and Plan


A/ 74-year-old lady with ESRD on HD s/p Left total mastectomy, SLNB, POD#1. SLNs

sent for frozen section returned as negative. Patient progressed well overnight.





P/ If all well post-dialysis, from surgical standpoint, patient is cleared for 

discharge.


- Patient to follow-up in Breast clinic on 6/4/2020 at [facility] 

([phone])





Subjective


Date of service: 05/30/20


Principal diagnosis: Left breast cancer


Interval history: 





Ms. Nuha Jo is a 74-year-old lady s/p left total mastectomy, sentinel 

lymph node biopsy for left breast cancer, POD#1. Patient with history of ESRD on

HD. Post-op patient's blood pressure was labile but stabilized. Overnight 

patient progressed well, pain controlled with little complaint. Patient is sche

duled to have dialysis this morning.








Objective





- Exam


Narrative Exam: 





Left chest wall incision clean, dry and intact. No erythema, no swelling, no 

discharge, minimal tenderness. Mastectomy bed flat AMARI drains in place and 

functioning with blood noted in drains. Overnight output was 90 from AMARI #1 and 

30 from AMARI #2.





- Constitutional


Vitals: 


                               Vital Signs - 12hr











  05/29/20 05/30/20 05/30/20





  23:30 04:56 07:43


 


Temperature 97.8 F 97.3 F L 98.2 F


 


Pulse Rate 67 62 61


 


Respiratory 16 20 18





Rate   


 


Blood Pressure 150/68  137/60


 


Blood Pressure  134/56 





[Left]   


 


O2 Sat by Pulse 100 95 97





Oximetry   











General appearance: Present: no acute distress





- EENT


Eyes: PERRL, EOM intact


ENT: hearing intact





- Neck


Neck: supple, normal ROM





- Respiratory


Respiratory effort: normal





- Breasts


Breasts: other (Incision site clean, dry and intact, no erythema, no discharge, 

minimal tenderness. Mastectomy bed flat; AMARI drains in place and functioning AMARI 

#1 90, #2 30 at shift change this morning)





- Cardiovascular


Rhythm: regular


Extremities: no ischemia





- Gastrointestinal


General gastrointestinal: Present: soft, non-tender, non-distended


Rectal Exam: deferred





- Genitourinary


Female genitourinary: deferred





- Integumentary


Integumentary: clear, warm, dry





- Musculoskeletal


Musculoskeletal: strength equal bilaterally





- Psychiatric


Psychiatric: appropriate mood/affect, intact judgment & insight, memory intact





- Labs


CBC & Chem 7: 


                                 05/29/20 09:35





                                 05/29/20 18:34


Labs: 


                              Abnormal lab results











  05/29/20 05/29/20 05/29/20 Range/Units





  09:35 09:35 10:02 


 


MCH  27 L    (28-32)  pg


 


RDW  18.9 H    (13.2-15.2)  %


 


Potassium   3.3 L   (3.6-5.0)  mmol/L


 


Chloride     ()  mmol/L


 


Carbon Dioxide     (22-30)  mmol/L


 


BUN   26 H   (7-17)  mg/dL


 


Creatinine   4.7 H   (0.7-1.2)  mg/dL


 


Glucose   119 H   ()  mg/dL


 


POC Glucose    112 H  ()  














  05/29/20 Range/Units





  18:34 


 


MCH   (28-32)  pg


 


RDW   (13.2-15.2)  %


 


Potassium  3.4 L  (3.6-5.0)  mmol/L


 


Chloride  95.7 L  ()  mmol/L


 


Carbon Dioxide  21 L D  (22-30)  mmol/L


 


BUN  29 H  (7-17)  mg/dL


 


Creatinine  4.7 H  (0.7-1.2)  mg/dL


 


Glucose  166 H  ()  mg/dL


 


POC Glucose   ()  














Medications & Allergies





- Medications


Allergies/Adverse Reactions: 


                                    Allergies





No Known Allergies Allergy (Verified 03/18/20 17:40)


   








Home Medications: 


                                Home Medications











 Medication  Instructions  Recorded  Confirmed  Last Taken  Type


 


Amlodipine Besylate [Norvasc] 10 mg PO DAILY 02/06/20 05/29/20 05/29/20 05:00 

History


 


lisinopriL [Zestril TAB] 10 mg PO QDAY #30 tablet 02/10/20 05/29/20 05/28/20 

09:30 Rx


 


Anastrozole [Arimidex] 1 mg PO DAILY 05/13/20 05/29/20 05/28/20 09:30 History


 


Ergocalciferol (Vitamin D2) 50,000 unit PO QWEEK 05/13/20 05/29/20 05/23/20 

12:00 History





[Vitamin D2]     


 


Furosemide [Lasix TAB] 80 mg PO DAILY 05/13/20 05/29/20 Unknown History


 


hydroCHLOROthiazide [Hctz] 12.5 mg PO QDAY 05/13/20 05/29/20 05/28/20 09:30 

History











Active Medications: 














Generic Name Dose Route Start Last Admin





  Trade Name Freq  PRN Reason Stop Dose Admin


 


Acetaminophen  650 mg  05/29/20 15:54 





  Tylenol  PO  





  Q4H PRN  





  Pain MILD(1-3)/Fever >100.5/HA  


 


Amlodipine Besylate  10 mg  05/30/20 10:00 





  Amlodipine  PO  





  DAILY [facility]  


 


Ergocalciferol  50,000 unit  06/05/20 10:00 





  Vitamin D2  PO  





  QWEEK SALLY  


 


Furosemide  80 mg  05/30/20 10:00 





  Lasix  PO  





  QDAY [facility]  


 


Hydrochlorothiazide  12.5 mg  05/29/20 18:20 





  Hctz  PO  





  QDAY [facility]  


 


Sodium Chloride  1,000 mls @ 42 mls/hr  05/29/20 09:00  05/29/20 09:40





  Nacl 0.9% 1000 Ml  IV   42 mls/hr





  AS DIRECT SALLY   Administration


 


Sodium Chloride  100 mls @ 999 mls/hr  05/30/20 06:26 





  Nacl 0.9%  IV  





  CHULA PRN  





  Hypotension  


 


Lisinopril  10 mg  05/29/20 18:20 





  Zestril  PO  





  QDAY [facility]  


 


Miscellaneous Medication  1 mg  05/30/20 10:00 





  Anastrozole [Arimidex]  PO  





  DAILY [facility]  


 


Ondansetron HCl  4 mg  05/29/20 08:48 





  Zofran  IV  





  ONCE PRN  





  Nausea And Vomiting  


 


Ondansetron HCl  4 mg  05/29/20 15:54 





  Zofran  IV  





  Q8H PRN  





  Nausea And Vomiting  


 


Sodium Chloride  10 ml  05/29/20 22:00 





  Sodium Chloride Flush Syringe 10 Ml  IV  





  BID SALLY  


 


Sodium Chloride  10 ml  05/29/20 15:54 





  Sodium Chloride Flush Syringe 10 Ml  IV  





  PRN PRN  





  LINE FLUSH

## 2020-05-30 NOTE — DISCHARGE SUMMARY
Providers





- Providers


Date of Admission: 


05/29/20 16:24





Attending physician: 


SUNIL RUSSO





                                        





05/29/20 15:54


Consult to Physician [CONS] Routine 


   Comment: 


   Consulting Provider: TIFFANY SIMMONS


   Physician Instructions: 


   Reason For Exam: esrd needing dialysis











Primary care physician: 


KRISTY BENEDICT MD








Hospitalization


Condition: Good


Procedures: 


Hemodialysis








Hospital course: 


75 YO Female with ESRD on HD (T,R,Sa), HTN,BrCa admitted directly to medical 

floor for reinitiation of dialysis.  Pt seen and evaluated in her room.   

Patient denied fever, chills, chest pain, palpitations, hemoptysis, bright red 

blood per rectum, skin rash, recent ill contacts, or known exposure to COVID-19.

  Patient convalesced well during hospital course.  Patient treated with 

dialysis. advanced care planning conducted.  Nephrology consulted.  Patient 

medically optimized and back to usual state of health.  Patient seen and 

evaluated after dialysis and was found to be medically optimized and back to 

usual state of health.  Patient found to have no significant new physical exam 

findings.  Patient discharged home and instructed to resume previous outpatient 

dialysis schedule as per nephrology team.  Patient instructed to follow-up with 

her primary care physician within 3 to 5 days.  Patient instructed to follow-up 

with nephrology as instructed.  35 minutes dedicated to patient discharge and 

coordination of care.








Disposition: DC-01 TO HOME OR SELFCARE





- Discharge Diagnoses


(1) End stage renal disease


Status: Acute   





(2) Hypertension


Status: Acute   


Qualifiers: 


   Hypertension type: essential hypertension   Qualified Code(s): I10 - 

Essential (primary) hypertension   





(3) Breast cancer


Status: Acute   





(4) DVT prophylaxis


Status: Acute   





Core Measure Documentation





- Palliative Care


Palliative Care/ Comfort Measures: Not Applicable





- Core Measures


Any of the following diagnoses?: none





Exam





- Constitutional


Vitals: 


                                        











Temp Pulse Resp BP Pulse Ox


 


 98.3 F   58 L  19   119/40   100 


 


 05/30/20 14:55  05/30/20 14:55  05/30/20 14:55  05/30/20 14:55  05/30/20 14:55











General appearance: Present: no acute distress, well-nourished





- EENT


Eyes: Present: PERRL


ENT: hearing intact, clear oral mucosa





- Neck


Neck: Present: supple, normal ROM





- Respiratory


Respiratory effort: normal


Respiratory: bilateral: CTA





- Cardiovascular


Heart Sounds: Present: S1 & S2.  Absent: rub, click





- Extremities


Extremities: pulses symmetrical, No edema


Peripheral Pulses: within normal limits





- Abdominal


General gastrointestinal: Present: soft, non-tender, non-distended, normal bowel

 sounds


Female genitourinary: Present: normal





- Integumentary


Integumentary: Present: clear, warm, dry





- Musculoskeletal


Musculoskeletal: gait normal, strength equal bilaterally





- Psychiatric


Psychiatric: appropriate mood/affect, intact judgment & insight





- Neurologic


Neurologic: CNII-XII intact, moves all extremities





Plan


Activity: advance as tolerated


Diet: renal


Follow up with: 


KRISTY BENEDICT MD [Primary Care Provider] - 7 Days


Prescriptions: 


oxyCODONE /ACETAMINOPHEN [Percocet 5/325] 1 tab PO Q6HR PRN #20 tablet


 PRN Reason: Pain

## 2020-05-30 NOTE — CONSULTATION
History of Present Illness





- Reason for Consult


Consult date: 05/30/20


end stage renal disease





- History of Present Illness


 is a 74 year old female patient well known to our practice with 

history significant for type 2 diabetes mellitus, hypertension, ESRD on 

hemodialysis (TTS), anemia associated with ESRD and Left breast upper-outer 

quadrant Cancer who was admitted yesterday and underwent L total mastectomy.  

Patient denies any complaint at this time.  Post-op pain is mostly controlled.  

She is due for hemodialysis today.  Labs significant for creatinine 4.7.  

Nephrology was consulted for further evaluation and treatment of end stage renal

disease.





Past History


Past Medical History: cancer, diabetes, dialysis, ESRD, hypertension, other (see

hpi)


Past Surgical History: mastectomy, Other (dialysis access)


Social history: single


Family history: hypertension





Medications and Allergies


                                    Allergies











Allergy/AdvReac Type Severity Reaction Status Date / Time


 


No Known Allergies Allergy   Verified 03/18/20 17:40











                                Home Medications











 Medication  Instructions  Recorded  Confirmed  Last Taken  Type


 


Amlodipine Besylate [Norvasc] 10 mg PO DAILY 02/06/20 05/29/20 05/29/20 05:00 

History


 


lisinopriL [Zestril TAB] 10 mg PO QDAY #30 tablet 02/10/20 05/29/20 05/28/20 

09:30 Rx


 


Anastrozole [Arimidex] 1 mg PO DAILY 05/13/20 05/29/20 05/28/20 09:30 History


 


Ergocalciferol (Vitamin D2) 50,000 unit PO QWEEK 05/13/20 05/29/20 05/23/20 

12:00 History





[Vitamin D2]     


 


Furosemide [Lasix TAB] 80 mg PO DAILY 05/13/20 05/29/20 Unknown History


 


hydroCHLOROthiazide [Hctz] 12.5 mg PO QDAY 05/13/20 05/29/20 05/28/20 09:30 

History


 


oxyCODONE /ACETAMINOPHEN [Percocet 1 tab PO Q6HR PRN #20 tablet 05/30/20  

Unknown Rx





5/325]     











Active Meds: 


Active Medications





Acetaminophen (Tylenol)  650 mg PO Q4H PRN


   PRN Reason: Pain MILD(1-3)/Fever >100.5/HA


Amlodipine Besylate (Amlodipine)  10 mg PO DAILY SALLY


Ergocalciferol (Vitamin D2)  50,000 unit PO QWEEK SALLY


Furosemide (Lasix)  80 mg PO QDAY [facility]


Hydrochlorothiazide (Hctz)  12.5 mg PO QDAY [facility]


Sodium Chloride (Nacl 0.9% 1000 Ml)  1,000 mls @ 42 mls/hr IV AS DIRECT SALLY


   Last Admin: 05/29/20 09:40 Dose:  42 mls/hr


   Documented by: 


Sodium Chloride (Nacl 0.9%)  100 mls @ 999 mls/hr IV CHULA PRN


   PRN Reason: Hypotension


Lisinopril (Zestril)  10 mg PO QDAY [facility]


Miscellaneous Medication (Anastrozole [Arimidex])  1 mg PO DAILY [facility]


Ondansetron HCl (Zofran)  4 mg IV ONCE PRN


   PRN Reason: Nausea And Vomiting


Ondansetron HCl (Zofran)  4 mg IV Q8H PRN


   PRN Reason: Nausea And Vomiting


Sodium Chloride (Sodium Chloride Flush Syringe 10 Ml)  10 ml IV BID SALLY


Sodium Chloride (Sodium Chloride Flush Syringe 10 Ml)  10 ml IV PRN PRN


   PRN Reason: LINE FLUSH











Review of Systems


Constitutional: no weight loss, no weight gain, no fever, no chills, no 

anorexia, no weakness


Breasts: deferred


Cardiovascular: high blood pressure, no chest pain, no orthopnea, no edema, no 

syncope, no lightheadedness, no shortness of breath


Respiratory: no cough, no hemoptysis, no shortness of breath


Gastrointestinal: no abdominal pain, no nausea, no vomiting, no diarrhea, no 

melena


Genitourinary Female: no dysuria


Musculoskeletal: no muscle weakness


Integumentary: no rash


Neurological: no convulsions, no aphasia, no change in speech, no change in 

mentation





Exam





- Vital Signs


Vital signs: 


                                   Vital Signs











Temp Pulse Resp BP Pulse Ox


 


 98.5 F   66   20   172/71   98 


 


 05/18/20 10:30  05/18/20 10:30  05/18/20 10:30  05/18/20 10:30  05/18/20 10:30














- General Appearance


General appearance: well-developed, appears stated age, other (no distress, R IJ

 tunnel catheter)


EENT: ATNC, PERRL, hearing intact, vision intact


Neck: Present: neck supple, trachea midline


Respiratory: Clear to Ascultation


Heart: regular, S1S2, no murmurs


Gastrointestinal: Present: normoactive bowel sounds.  Absent: tenderness, 

distended


Integumentary: other (L chest area dressing noted)


Neurologic: no focal deficit, no asterixis, alert and oriented x3


Musculoskeletal: Present: other (no edema, R arm AVF)





Results





- Lab Results





                                 05/29/20 09:35





                                 05/29/20 18:34


                             Most recent lab results











Calcium  8.5 mg/dL (8.4-10.2)   05/29/20  18:34    














Assessment and Plan





1. End stage renal disease:


Patient is on maintenance hemodialysis three times a week, TTS schedule.


Med dosage based on GFR.


Hemodialysis: 5/30.





2. FEN:


Metabolic acidosis, HD today.


Monitor lytes.





3. Hypertension:


BP controlled.


Monitor BP.





4. L breast cancer:


S/p total mastectomy.





5. Anemia:


Epogen with HD.


Monitor.





6. Type 2 DM.

## 2020-10-06 ENCOUNTER — HOSPITAL ENCOUNTER (OUTPATIENT)
Dept: HOSPITAL 5 - SPVWC | Age: 75
Discharge: HOME | End: 2020-10-06
Attending: SURGERY
Payer: MEDICARE

## 2020-10-06 DIAGNOSIS — Z85.3: ICD-10-CM

## 2020-10-06 DIAGNOSIS — N63.11: Primary | ICD-10-CM

## 2020-10-06 NOTE — ULTRASOUND REPORT
EXAMINATION: Right Limited Breast Ultrasound, 10/6/2020



INDICATION:

PERSONAL HX OF MALIGNANT NEOPLASM OF BREAST. Patient presents for evaluation of an area of palpable c
oncern in the right breast.



COMPARISON:

Prior mammogram 8/27/2020



FINDINGS: Targeted ultrasound evaluation was performed of the area of interest.  Targeted ultrasound 
of the area of palpable concern in the right breast 11:00 position located 6 cm from the nipple revea
ls an island of focal dense fibroglandular tissue. No discrete cystic or solid lesion identified.



IMPRESSION:





1. Focal dense tissue is seen in the area of palpable concern in the right breast. No suspicious cyst
ic or solid lesion identified, therefore clinical correlation is recommended.



Follow up recommendation: Back to schedule.



BI-RADS Category 2:  Benign.



Signer Name: Jayashree Tim MD 

Signed: 10/6/2020 10:09 AM

Workstation Name: "[email or web address]

## 2021-01-12 ENCOUNTER — HOSPITAL ENCOUNTER (OUTPATIENT)
Dept: HOSPITAL 5 - SPVWC | Age: 76
Discharge: HOME | End: 2021-01-12
Attending: SURGERY
Payer: MEDICARE

## 2021-01-12 DIAGNOSIS — Z12.31: Primary | ICD-10-CM

## 2021-01-12 NOTE — MAMMOGRAPHY REPORT
DIGITAL SCREENING MAMMOGRAM WITH CAD, 1/12/2021



CLINICAL INFORMATION / INDICATION: Routine screening mammography. The patient has a personal history 
of left breast cancer treated with mastectomy.



TECHNIQUE:  Digital bilateral 2D mammography was obtained in the craniocaudal and mediolateral obliqu
e projections. This examination was interpreted with the benefit of Computer-Aided Detection analysis
.



COMPARISON: 1/10/2020



FINDINGS: 



Breast Density: There are scattered areas of fibroglandular density.



No dominant mass, suspicious calcifications, or architectural distortion in either breast. 



Scattered coarse calcifications and vascular calcifications are again noted.

 

IMPRESSION: No mammographic evidence of malignancy.



Follow up recommendation: Routine yearly



BI-RADS Category 2:  Benign.





-------------------------------------------------------------------------------------------

A "normal" or negative report should not discourage follow up or biopsy of a clinically significant f
inding.



A written summary of these findings will be mailed to the patient. The patient will be entered into a
 mammography reporting system which will generate a reminder letter for the patient's next appointmen
t at the appropriate interval.



The [employer] College [employer] recommends yearly mammograms starting at age 40 and continuing as l
jammie as a woman is in good health.  Breast MRI is recommended for women with an approximate 20-25% or 
greater lifetime risk of breast cancer, including women with a strong family history of breast or ova
sarah cancer or who have been treated for Hodgkin's disease.



Signer Name: Liliana Collins MD 

Signed: 1/12/2021 10:59 AM

Workstation Name: [email or web address]

## 2022-03-07 ENCOUNTER — HOSPITAL ENCOUNTER (OUTPATIENT)
Dept: HOSPITAL 5 - SPVWC | Age: 77
Discharge: HOME | End: 2022-03-07
Attending: SURGERY
Payer: MEDICARE

## 2022-03-07 DIAGNOSIS — Z12.31: Primary | ICD-10-CM

## 2022-03-07 DIAGNOSIS — N63.11: ICD-10-CM

## 2022-03-07 DIAGNOSIS — N64.89: ICD-10-CM

## 2022-03-08 NOTE — MAMMOGRAPHY REPORT
DIGITAL SCREENING MAMMOGRAM WITH CAD, 3/7/2022



CLINICAL INFORMATION / INDICATION: Routine screening mammography.



TECHNIQUE:  Digital right 2D mammography was obtained in the craniocaudal and mediolateral oblique pr
ojections. This examination was interpreted with the benefit of Computer-Aided Detection analysis.



COMPARISON: 1/12/2021, 8/27/2020



FINDINGS: 



Breast Density: There are scattered areas of fibroglandular density.



There is oval mass now present in the upper outer quadrant of the right breast, anterior depth, very 
superficial in location. Mass measures approximately 3 cm. This will need further evaluation with rig
ht breast ultrasound and possibly spot compression imaging.



Benign calcifications and benign vascular calcifications are present. 



IMPRESSION: 

3 cm mass has developed in the superficial right breast at approximately 11:00. Recommend further meño
luation with ultrasound and possibly spot compression imaging.



Follow up recommendation: Ultrasound



BI-RADS Category 0: INCOMPLETE. Needs additional imaging evaluation and/or prior mammograms for annalise kemp.





-------------------------------------------------------------------------------------------

A "normal" or negative report should not discourage follow up or biopsy of a clinically significant f
inding.



A written summary of these findings will be mailed to the patient. The patient will be entered into a
 mammography reporting system which will generate a reminder letter for the patient's next appointmen
t at the appropriate interval.



The [employer] recommends yearly mammograms starting at age 40 and continuing as l
jammie as a woman is in good health.  Breast MRI is recommended for women with an approximate 20-25% or 
greater lifetime risk of breast cancer, including women with a strong family history of breast or ova
sarah cancer or who have been treated for Hodgkin's disease.



Signer Name: Julissa Saldivar MD 

Signed: 3/8/2022 3:24 PM

Workstation Name: [email or web address]

## 2022-03-25 ENCOUNTER — HOSPITAL ENCOUNTER (OUTPATIENT)
Dept: HOSPITAL 5 - US | Age: 77
Discharge: HOME | End: 2022-03-25
Attending: SURGERY
Payer: MEDICARE

## 2022-03-25 DIAGNOSIS — Z85.3: ICD-10-CM

## 2022-03-25 DIAGNOSIS — N63.11: Primary | ICD-10-CM

## 2022-03-25 DIAGNOSIS — Z79.899: ICD-10-CM

## 2022-03-25 NOTE — ULTRASOUND REPORT
ULTRASOUND BREAST RIGHT LIMITED, 3/25/2022



CLINICAL INFORMATION / INDICATION: Z85.3 HX OF MALIGNANT NEOPLASM OF BREAST.



TECHNIQUE: Targeted ultrasound evaluation was performed of the area of interest.  



COMPARISON: 3/7/2022



FINDINGS: 

There is a 2.6 x 1.4 cm heterogeneous mass with some areas of shadowing and some mild marginal vascul
arity in the 11:00 position of the right breast 5 cm from the nipple which appears to correspond to t
he mammographic abnormality.





IMPRESSION: Mass lesion in the upper outer quadrant of the right breast is heterogeneous with areas o
f shadowing and mild peripheral vascularity.



Follow up recommendation: Biopsy



BI-RADS Category 4: SUSPICIOUS FOR MALIGNANCY. 





-------------------------------------------------------------------------------------------

A normal or "negative" report should not preclude biopsy or follow-up of a clinically suspicious find
ing.



Signer Name: Thad Akhtar MD 

Signed: 3/25/2022 8:38 AM

Workstation Name: [email or web address]

## 2023-06-11 NOTE — PROGRESS NOTE
Assessment and Plan


1. End stage renal disease:


Likely secondary to diabetic nephropathy.


GFR is currently 8.


Avoid nephrotoxic agents.


Meds dosage based on GFR.


Patient tolerated first HD on 2/6 well.


Right chest permcath placed 2/6.


Awaiting outpatient HD chair.


Hemodialysis: 2/6, 2.7.





2. FEN:


Metabolic acidosis, monitor.


Monitor lytes.





3. Hypertension:





4. Edema:


Bilateral lower extremities, chronic.





5. Anemia:


Epogen with HD.


Monitor.





6. Type 2 DM:











Subjective


Date of service: 02/07/20


Interval history: 


Patient was seen and examined at the bedside.


Sister is present at bedside.


Patient tolerated initial HD on 2/6 well.


She has no new complaints today.








Objective





- Exam


Narrative Exam: 


General appearance: well-developed, well-nourished, appears stated age, R chest 

permcath


EENT: ATNC, PERRL, mucous membranes moist


Neck: Present: neck supple, trachea midline


Respiratory: Clear to Ascultation, Decreased Breath Sounds (bilateral bases)


Heart: regular, normal heart rate, S1S2, no murmurs


Gastrointestinal: Present: normal, normoactive bowel sounds


Integumentary: no rash, warm and dry


Neurologic: no focal deficit, alert and oriented x3


Musculoskeletal: Present: other (1+ pitting edema bilateral lower extremities)


Psychiatric: mood/affect appropriate, cooperative








- Vital Signs


Vital signs: 


                               Vital Signs - 12hr











  02/07/20 02/07/20 02/07/20





  00:27 01:17 06:13


 


Temperature 102.2 F H 100.1 F H 99.0 F


 


Pulse Rate 81 82 67


 


Respiratory 20 100 H 18





Rate   


 


Blood Pressure 159/71  


 


Blood Pressure  153/69 





[Left]   


 


O2 Sat by Pulse 97  96





Oximetry   














- Lab





                                 02/06/20 12:27





                                 02/06/20 12:27


                             Most recent lab results











Calcium  8.7 mg/dL (8.4-10.2)   02/06/20  12:27    














Medications & Allergies





- Medications


Allergies/Adverse Reactions: 


                                    Allergies





No Known Allergies Allergy (Unverified 02/06/20 11:19)


   








Home Medications: 


                                Home Medications











 Medication  Instructions  Recorded  Confirmed  Last Taken  Type


 


Amlodipine Besylate [Norvasc] 10 mg PO DAILY 02/06/20 02/06/20 02/06/20 History





    10 mg 


 


Furosemide [Lasix TAB] 80 mg PO BID 02/06/20 02/06/20 02/06/20 History





    80 mg 











Active Medications: 














Generic Name Dose Route Start Last Admin





  Trade Name Freq  PRN Reason Stop Dose Admin


 


Acetaminophen  650 mg  02/07/20 01:29  02/07/20 01:50





  Tylenol  PO   650 mg





  Q4H PRN   Administration





  Pain, Mild (1-3)  


 


Epoetin Chente  10,000 unit  02/06/20 14:39 





  Procrit  SUB-Q  





  CHULA PRN  





  hemodialysis  


 


Sodium Chloride  100 mls @ 999 mls/hr  02/07/20 09:12 





  Nacl 0.9%  IV  





  CHULA PRN  





  Hypotension Chart(s)/Patient